# Patient Record
Sex: MALE | URBAN - NONMETROPOLITAN AREA
[De-identification: names, ages, dates, MRNs, and addresses within clinical notes are randomized per-mention and may not be internally consistent; named-entity substitution may affect disease eponyms.]

---

## 2023-09-22 ENCOUNTER — APPOINTMENT (OUTPATIENT)
Dept: URBAN - NONMETROPOLITAN AREA SURGERY 8 | Age: 43
Setting detail: DERMATOLOGY
End: 2023-09-22

## 2023-09-22 DIAGNOSIS — L82.1 OTHER SEBORRHEIC KERATOSIS: ICD-10-CM

## 2023-09-22 DIAGNOSIS — B35.1 TINEA UNGUIUM: ICD-10-CM

## 2023-09-22 PROCEDURE — OTHER COUNSELING: OTHER

## 2023-09-22 PROCEDURE — OTHER REASSURANCE: OTHER

## 2023-09-22 PROCEDURE — OTHER ORDER TESTS: OTHER

## 2023-09-22 PROCEDURE — 99203 OFFICE O/P NEW LOW 30 MIN: CPT

## 2023-09-22 PROCEDURE — OTHER MIPS QUALITY: OTHER

## 2023-09-22 ASSESSMENT — LOCATION SIMPLE DESCRIPTION DERM
LOCATION SIMPLE: LEFT GREAT TOE
LOCATION SIMPLE: RIGHT GREAT TOE
LOCATION SIMPLE: RIGHT UPPER BACK

## 2023-09-22 ASSESSMENT — LOCATION DETAILED DESCRIPTION DERM
LOCATION DETAILED: LEFT GREAT TOENAIL
LOCATION DETAILED: RIGHT MID-UPPER BACK
LOCATION DETAILED: RIGHT GREAT TOENAIL

## 2023-09-22 ASSESSMENT — LOCATION ZONE DERM
LOCATION ZONE: TOENAIL
LOCATION ZONE: TRUNK

## 2023-09-22 NOTE — PROCEDURE: ORDER TESTS
Please prep for [facility] and send to provider, then cancel with previous pharmacy, [facility] in [facility]. Phone [phone]    Performing Laboratory: [id number] Performing Laboratory: -[id number]

## 2023-09-22 NOTE — PROCEDURE: ORDER TESTS
Expected Date Of Service: 09/22/2023 For information on Fall & Injury Prevention, visit: [email or web address] OR  [email or web address] OR  [email or web address].gov/[email or web address]/[email or web address]

## 2024-04-04 ENCOUNTER — RX ONLY (RX ONLY)
Age: 44
End: 2024-04-04

## 2024-04-04 RX ORDER — CICLOPIROX 80 MG/ML
SOLUTION TOPICAL
Qty: 6.6 | Refills: 3 | Status: ERX | COMMUNITY
Start: 2024-04-04

## 2024-04-04 RX ORDER — CICLOPIROX 80 MG/ML
SOLUTION TOPICAL
Qty: 6.6 | Refills: 3 | Status: ERX

## 2024-07-02 ENCOUNTER — RX ONLY (RX ONLY)
Age: 44
End: 2024-07-02

## 2024-07-02 RX ORDER — CICLOPIROX 80 MG/ML
SOLUTION TOPICAL
Qty: 6.6 | Refills: 11 | Status: ERX